# Patient Record
Sex: FEMALE | Race: BLACK OR AFRICAN AMERICAN | NOT HISPANIC OR LATINO | Employment: FULL TIME | URBAN - METROPOLITAN AREA
[De-identification: names, ages, dates, MRNs, and addresses within clinical notes are randomized per-mention and may not be internally consistent; named-entity substitution may affect disease eponyms.]

---

## 2017-06-14 ENCOUNTER — HOSPITAL ENCOUNTER (EMERGENCY)
Facility: HOSPITAL | Age: 52
Discharge: HOME | End: 2017-06-14
Attending: EMERGENCY MEDICINE
Payer: MEDICAID

## 2017-06-14 VITALS
TEMPERATURE: 99 F | DIASTOLIC BLOOD PRESSURE: 90 MMHG | BODY MASS INDEX: 34.1 KG/M2 | WEIGHT: 225 LBS | HEART RATE: 81 BPM | RESPIRATION RATE: 18 BRPM | OXYGEN SATURATION: 99 % | SYSTOLIC BLOOD PRESSURE: 166 MMHG | HEIGHT: 68 IN

## 2017-06-14 DIAGNOSIS — R51.9 NONINTRACTABLE HEADACHE, UNSPECIFIED CHRONICITY PATTERN, UNSPECIFIED HEADACHE TYPE: Primary | ICD-10-CM

## 2017-06-14 LAB
POCT GLUCOSE: 137 MG/DL (ref 70–110)
POCT GLUCOSE: 60 MG/DL (ref 70–110)
POCT GLUCOSE: 69 MG/DL (ref 70–110)

## 2017-06-14 PROCEDURE — 96372 THER/PROPH/DIAG INJ SC/IM: CPT

## 2017-06-14 PROCEDURE — 82962 GLUCOSE BLOOD TEST: CPT

## 2017-06-14 PROCEDURE — 63600175 PHARM REV CODE 636 W HCPCS: Performed by: PHYSICIAN ASSISTANT

## 2017-06-14 PROCEDURE — 99284 EMERGENCY DEPT VISIT MOD MDM: CPT | Mod: 25

## 2017-06-14 RX ORDER — KETOROLAC TROMETHAMINE 30 MG/ML
10 INJECTION, SOLUTION INTRAMUSCULAR; INTRAVENOUS
Status: COMPLETED | OUTPATIENT
Start: 2017-06-14 | End: 2017-06-14

## 2017-06-14 RX ORDER — BUTALBITAL, ACETAMINOPHEN AND CAFFEINE 50; 325; 40 MG/1; MG/1; MG/1
1 TABLET ORAL
Status: DISCONTINUED | OUTPATIENT
Start: 2017-06-14 | End: 2017-06-14 | Stop reason: HOSPADM

## 2017-06-14 RX ADMIN — KETOROLAC TROMETHAMINE 10 MG: 30 INJECTION, SOLUTION INTRAMUSCULAR at 07:06

## 2017-06-14 NOTE — ED TRIAGE NOTES
Pt c/o headache which began this morning.  Pt had nausea and diarrhea.  States decreased appetite.

## 2017-06-15 NOTE — ED PROVIDER NOTES
Encounter Date: 6/14/2017    SCRIBE #1 NOTE: Crys STARK am scribing for, and in the presence of,  Abelardo Harvey PA-C . I have scribed the following portions of the note - Other sections scribed: HPI/ROS .       History     Chief Complaint   Patient presents with    Headache     Headache intermittent all day.      Review of patient's allergies indicates:  No Known Allergies  CC: Headache     HPI: This 52 y.o. female with HTN, NIDDM, anemia, blindness of the L eye, obesity, and menorrhagia presents to the ED c/o an intermittent L sided frontal HA which began upon waking this morning. She reports having associated nausea this morning that is now resolved. Pt states after taking her antihypertensive medication diabetes medication, and 2 tables of aspirin, her HA was temporarily alleviated. However, headache returned while she was at work, prompting her arrival to the ED. Pt states when her headache occurs, it is severe and takes her breath away. Pt denies a hx of frequent headache and similar HA. She otherwise denies dizziness, numbness, weakness, speech difficulty, coordination issues, sinus pressure, trouble swallowing, fever, and visual changes.         The history is provided by the patient. No  was used.     Past Medical History:   Diagnosis Date    Anemia     Blindness of left eye     Diabetes mellitus type II 3/12/13    Hypertension     Menorrhagia     Obesity      Past Surgical History:   Procedure Laterality Date    bilateral tubal ligation      EYE SURGERY      left eye surgery secondary to trauma      TUBAL LIGATION       Family History   Problem Relation Age of Onset    Hypertension Mother     Cancer Mother      metastatic cervical    Hypertension Father     Diabetes Brother      Social History   Substance Use Topics    Smoking status: Current Every Day Smoker     Packs/day: 0.50     Years: 24.00     Types: Cigarettes    Smokeless tobacco: Never Used     Alcohol use Yes      Comment: socially     Review of Systems    Physical Exam     Initial Vitals [06/14/17 1624]   BP Pulse Resp Temp SpO2   (!) 172/108 104 18 98.3 °F (36.8 °C) 97 %     Physical Exam    Vitals reviewed.  Constitutional: She appears well-developed and well-nourished. She is not diaphoretic. No distress.   HENT:   Head: Normocephalic and atraumatic.   Right Ear: External ear normal.   Left Ear: External ear normal.   Nose: Nose normal.   Mouth/Throat: Oropharynx is clear and moist. No oropharyngeal exudate.   Eyes: Conjunctivae and EOM are normal. Pupils are equal, round, and reactive to light. No scleral icterus.   Neck: Normal range of motion. Neck supple.   Cardiovascular: Normal rate, regular rhythm, normal heart sounds and intact distal pulses.   Pulmonary/Chest: Breath sounds normal. No respiratory distress. She has no wheezes. She has no rhonchi. She has no rales. She exhibits no tenderness.   Musculoskeletal: Normal range of motion.   Lymphadenopathy:     She has no cervical adenopathy.   Neurological: She is alert and oriented to person, place, and time. She has normal strength. No cranial nerve deficit or sensory deficit.   Skin: Skin is warm and dry. No rash noted.   Psychiatric: She has a normal mood and affect. Thought content normal.         ED Course   Procedures  Labs Reviewed   POCT GLUCOSE - Abnormal; Notable for the following:        Result Value    POCT Glucose 69 (*)     All other components within normal limits   POCT GLUCOSE - Abnormal; Notable for the following:     POCT Glucose 60 (*)     All other components within normal limits   POCT GLUCOSE - Abnormal; Notable for the following:     POCT Glucose 137 (*)     All other components within normal limits   POCT GLUCOSE MONITORING CONTINUOUS   POCT GLUCOSE MONITORING CONTINUOUS             Medical Decision Making:   Initial Assessment:   52-year-old female complains of left-sided headache since this morning.  Patient explains  she does not typically get headaches.  She denies fever, changes in vision, trauma, syncope, neck stiffness.  Differential Diagnosis:   Primary headache, subarachnoid hemorrhage, meningitis, encephalitis, temporal arteritis, dental pain, sinusitis  ED Management:  Patient presents well-appearing in no distress, afebrile, tachycardic and hypertensive at triage.  Heart rate at time of exam 90.  HEENT exam is unremarkable.  Cranial nerves grossly intact.  Neck is supple.  No meningeal signs.  Accu-Chek initially 69, which may be contributing to headache.  Patient explains she has not eaten very much today since having the headache.  Patient is on a sulfonylurea.  Patient given orange juice, apple juice, and snacks with glucose increasing to 137.  CT scan shows no obvious hemorrhage or mass lesion.  Patient treated with Toradol and Fioricet in the ED with significant relief.  I do not suspect subarachnoid hemorrhage.  Patient given instructions to check her blood glucose frequently and to eat full meals when she gets home to avoid hypoglycemia.  She verbalized understanding and agreed.  She was discharged with return precautions.             Scribe Attestation:   Scribe #1: I performed the above scribed service and the documentation accurately describes the services I performed. I attest to the accuracy of the note.    Attending Attestation:           Physician Attestation for Scribe:  Physician Attestation Statement for Scribe #1: I, Abelardo Harvey PA-C , reviewed documentation, as scribed by Crys Burdick  in my presence, and it is both accurate and complete.                 ED Course     Clinical Impression:   The encounter diagnosis was Nonintractable headache, unspecified chronicity pattern, unspecified headache type.          Abelardo Harvey PA-C  06/14/17 2240

## 2023-09-13 ENCOUNTER — HOSPITAL ENCOUNTER (EMERGENCY)
Facility: HOSPITAL | Age: 58
Discharge: HOME | End: 2023-09-13
Attending: EMERGENCY MEDICINE
Payer: MEDICAID

## 2023-09-13 VITALS
BODY MASS INDEX: 34.86 KG/M2 | TEMPERATURE: 98 F | HEIGHT: 68 IN | HEART RATE: 102 BPM | RESPIRATION RATE: 18 BRPM | SYSTOLIC BLOOD PRESSURE: 143 MMHG | WEIGHT: 230 LBS | OXYGEN SATURATION: 96 % | DIASTOLIC BLOOD PRESSURE: 87 MMHG

## 2023-09-13 DIAGNOSIS — M25.562 LEFT KNEE PAIN: Primary | ICD-10-CM

## 2023-09-13 LAB — POCT GLUCOSE: 211 MG/DL (ref 70–110)

## 2023-09-13 PROCEDURE — 82962 GLUCOSE BLOOD TEST: CPT | Mod: ER

## 2023-09-13 PROCEDURE — 99283 EMERGENCY DEPT VISIT LOW MDM: CPT | Mod: ER

## 2023-09-13 RX ORDER — ACETAMINOPHEN 500 MG
1000 TABLET ORAL EVERY 8 HOURS PRN
Qty: 20 TABLET | Refills: 0 | Status: SHIPPED | OUTPATIENT
Start: 2023-09-13

## 2023-09-13 RX ORDER — IBUPROFEN 600 MG/1
600 TABLET ORAL EVERY 6 HOURS PRN
Qty: 20 TABLET | Refills: 0 | Status: SHIPPED | OUTPATIENT
Start: 2023-09-13

## 2023-09-13 NOTE — Clinical Note
"Chitra Lewis" Justus was seen and treated in our emergency department on 9/13/2023.  She may return to work on 09/15/2023.       If you have any questions or concerns, please don't hesitate to call.      GOKUL CALDERA"

## 2023-09-13 NOTE — ED PROVIDER NOTES
Encounter Date: 9/13/2023    SCRIBE #1 NOTE: I, Milagro Flores, am scribing for, and in the presence of,  Tanvi Dillard NP. I have scribed the following portions of the note - Other sections scribed: HPI, ROS.       History     Chief Complaint   Patient presents with    Joint Swelling     A 59 y/o female presents to the ER c/o nontraumatic left knee pain and swelling since last Thursday. Pt reports using ICY Hot topical cream for pain.      Chitra Jerome is a 58 y.o. female, with a PMHx of HTN, T2DM, who presents to the ED with L knee pain that began 6 days ago. Patient reports pain became much worse last night. She describes pain as internal and is worse when she bends her knee. She has attempted Tx with Icy Hot with no relief. She states she has had issues with her L knee before, stating in her 20's she injured it and had to wear a soft cast for a week. Denies recent trauma/injury. No other exacerbating or alleviating factors. Denies fever, chills, nausea, vomiting, diarrhea or other associated symptoms.       The history is provided by the patient. No  was used.     Review of patient's allergies indicates:  No Known Allergies  Past Medical History:   Diagnosis Date    Anemia     Blindness of left eye     Diabetes mellitus type II 3/12/13    Hypertension     Menorrhagia     Obesity      Past Surgical History:   Procedure Laterality Date    bilateral tubal ligation      EYE SURGERY      left eye surgery secondary to trauma      TUBAL LIGATION       Family History   Problem Relation Age of Onset    Hypertension Mother     Cancer Mother         metastatic cervical    Hypertension Father     Diabetes Brother      Social History     Tobacco Use    Smoking status: Every Day     Current packs/day: 0.50     Average packs/day: 0.5 packs/day for 24.0 years (12.0 ttl pk-yrs)     Types: Cigarettes    Smokeless tobacco: Never   Substance Use Topics    Alcohol use: Yes     Comment: socially    Drug  use: No     Review of Systems   Constitutional:  Negative for chills and fever.   HENT:  Negative for congestion and sore throat.    Eyes:  Negative for visual disturbance.   Respiratory:  Negative for cough and shortness of breath.    Cardiovascular:  Negative for chest pain.   Gastrointestinal:  Negative for abdominal pain, diarrhea, nausea and vomiting.   Genitourinary:  Negative for dysuria and vaginal discharge.   Musculoskeletal:  Positive for arthralgias (L knee).   Skin:  Negative for rash.   Neurological:  Negative for headaches.   Psychiatric/Behavioral:  Negative for decreased concentration.        Physical Exam     Initial Vitals [09/13/23 1019]   BP Pulse Resp Temp SpO2   (!) 147/85 (!) 117 18 97.8 °F (36.6 °C) 96 %      MAP       --         Physical Exam    Constitutional: She appears well-developed and well-nourished. She is not diaphoretic. No distress.   HENT:   Head: Normocephalic and atraumatic.   Neck:   Normal range of motion.  Cardiovascular:  Intact distal pulses.           Pulmonary/Chest: No respiratory distress.   Musculoskeletal:         General: Normal range of motion.      Cervical back: Normal range of motion.      Left knee: No swelling. Normal range of motion. Tenderness present.     Neurological: She is alert and oriented to person, place, and time.   Skin: Skin is warm and dry.   Psychiatric: She has a normal mood and affect. Her behavior is normal.         ED Course   Procedures  Labs Reviewed   POCT GLUCOSE - Abnormal; Notable for the following components:       Result Value    POCT Glucose 211 (*)     All other components within normal limits   POCT GLUCOSE MONITORING CONTINUOUS          Imaging Results              X-Ray Knee 3 View Left (Final result)  Result time 09/13/23 13:25:55      Final result by Piter Cole MD (09/13/23 13:25:55)                   Impression:      1. No acute displaced fracture or dislocation of the knee.      Electronically signed by: Piter  MD Kelsey  Date:    09/13/2023  Time:    13:25               Narrative:    EXAMINATION:  XR KNEE 3 VIEW LEFT    CLINICAL HISTORY:  Pain in left knee    TECHNIQUE:  AP, lateral, and Merchant views of the left knee were performed.    COMPARISON:  None    FINDINGS:  Three views left knee.    No acute displaced fracture or dislocation of the knee.  No radiopaque foreign body.  No large knee joint effusion.                                       Medications - No data to display  Medical Decision Making  58-year-old female presenting to the ED for evaluation of left knee pain.  Denies any injury or trauma.  No fever or chills.  Full physical exam as above.  No findings concerning for infection or neurovascular compromise.  X-ray negative for acute displaced fracture dislocation.  Will place an Ace wrap.  NSAIDs for pain.  Ortho follow-up.    Based on my clinical evaluation, I do not appreciate any immediate, emergent, or life threatening condition or etiology that warrants additional workup today.  I feel the patient can be discharged with close follow-up care.     Amount and/or Complexity of Data Reviewed  Radiology: ordered. Decision-making details documented in ED Course.    Risk  OTC drugs.  Prescription drug management.            Scribe Attestation:   Scribe #1: I performed the above scribed service and the documentation accurately describes the services I performed. I attest to the accuracy of the note.        ED Course as of 09/13/23 1719   Wed Sep 13, 2023   1329 X-Ray Knee 3 View Left  No acute displaced fracture or dislocation of the knee. [MT]      ED Course User Index  [MT] Tanvi Dillard, HARPER THURMAN, personally performed the services described in this documentation. All medical record entries made by the scribe were at my direction and in my presence.  I have reviewed the chart and agree that the record reflects my personal performance and is accurate and complete.    Clinical  Impression:   Final diagnoses:  [M25.562] Left knee pain (Primary)        ED Disposition Condition    Discharge Stable          ED Prescriptions       Medication Sig Dispense Start Date End Date Auth. Provider    ibuprofen (ADVIL,MOTRIN) 600 MG tablet Take 1 tablet (600 mg total) by mouth every 6 (six) hours as needed for Pain. 20 tablet 9/13/2023 -- Tavni Dillard NP    acetaminophen (TYLENOL) 500 MG tablet Take 2 tablets (1,000 mg total) by mouth every 8 (eight) hours as needed for Pain. 20 tablet 9/13/2023 -- Tanvi Dillard NP          Follow-up Information       Follow up With Specialties Details Why Contact Info    Gaby Auguste MD Orthopedic Surgery Schedule an appointment as soon as possible for a visit  For follow-up [address] [facility] [address] [facility]  [place] [address]  [phone]      [facility] Surgical Oncology, Orthopedic Surgery, Genetics, Physical Medicine and Rehabilitation, Occupational Therapy, Radiology Schedule an appointment as soon as possible for a visit  For follow-up----orthopedics [address] [facility] [address]  [phone]      [facility] ED Emergency Medicine Go to  If symptoms worsen [address] [address] [phone]             Tanvi Dillard NP  09/13/23 5974

## 2023-09-13 NOTE — DISCHARGE INSTRUCTIONS
Thank you for coming to our Emergency Department today. It is important to remember that some problems or medical conditions are difficult to diagnose and may not be found during your Emergency Department visit.     Be sure to follow up with your primary care doctor and review all labs/imaging/tests that were performed during your ER visit with them. Some labs/tests may be outside of the normal range and require non-emergent follow-up and further investigation to help diagnose/exclude/prevent complications or other potentially serious medical conditions that were not addressed during your ER visit.    If you do not have a primary care doctor, you may contact the one listed on your discharge paperwork or you may also call the [facility] Appointment Desk at [phone] to schedule an appointment and establish care with one. It is important to your health that you have a primary care doctor.    Please take all medications as directed. All medications may potentially have side-effects and it is impossible to predict which medications may give you side-effects or what side-effects (if any) they will give you.. If you feel that you are having a negative effect or side-effect of any medication you should immediately stop taking them and seek medical attention. If you feel that you are having a life-threatening reaction call 911.    Return to the ER with any questions/concerns, new/concerning symptoms, worsening or failure to improve.     Do not drive, swim, climb to height, take a bath, operate heavy machinery, drink alcohol or take potentially sedating medications, sign any legal documents or make any important decisions for 24 hours if you have received any pain medications, sedatives or mood altering drugs during your ER visit or within 24 hours of taking them if they have been prescribed to you.     You can find additional resources for Dentists, hearing aids, durable medical equipment, low cost pharmacies and  other resources at https://[facility].org    BELOW THIS LINE ONLY APPLIES IF YOU HAVE A COVID TEST PENDING OR IF YOU HAVE BEEN DIAGNOSED WITH COVID:  Please access [facility] to review the results of your test. Until the results of your COVID test return, you should isolate yourself so as not to potentially spread illness to others.   If your COVID test returns positive, you should isolate yourself so as not to spread illness to others. After five full days, if you are feeling better and you have not had fever for 24 hours, you can return to your typical daily activities, but you must wear a mask around others for an additional 5 days.   If your COVID test returns negative and you are either unvaccinated or more than six months out from your two-dose vaccine and are not yet boosted, you should still quarantine for 5 full days followed by strict mask use for an additional 5 full days.   If your COVID test returns negative and you have received your 2-dose initial vaccine as well as a booster, you should continue strict mask use for 10 full days after the exposure.  For all those exposed, best practice includes a test at day 5 after the exposure. This can be a home test or a test through one of the many testing centers throughout our community.   Masking is always advised to limit the spread of COVID. Cdc.gov is an excellent site to obtain the latest up to date recommendations regarding COVID and COVID testing.     CDC Testing and Quarantine Guidelines for patients with exposure to a known-positive COVID-19 person:  A close exposure is defined as anyone who has had an exposure (masked or unmasked) to a known COVID -19 positive person within 6 feet of someone for a cumulative total of 15 minutes or more over a 24-hour period.   Vaccinated and/or if you recently had a positive covid test within 90 days do NOT need to quarantine after contact with someone who had COVID-19 unless you develop symptoms.   Fully vaccinated  people who have not had a positive test within 90 days, should get tested 3-5 days after their exposure, even if they don't have symptoms and wear a mask indoors in public for 14 days following exposure or until their test result is negative.      Unvaccinated and/or NOT had a positive test within 90 days and meet close exposure  You are required by CDC guidelines to quarantine for at least 5 days from time of exposure followed by 5 days of strict masking. It is recommended, but not required to test after 5 days, unless you develop symptoms, in which case you should test at that time.  If you get tested after 5 days and your test is positive, your 5 day period of isolation starts the day of the positive test.    If your exposure does not meet the above definition, you can return to your normal daily activities to include social distancing, wearing a mask and frequent handwashing.      Here is a link to guidance from the CDC:  https://www.cdc.gov/media/releases/2021/s1227-isolation-quarantine-guidance.html      [place] Dept Of Health Testing Sites:  https://ldh.[place].gov/page/3934      [facility] website with testing locations and guidance:  [email or web address]